# Patient Record
Sex: MALE | Race: WHITE | NOT HISPANIC OR LATINO | Employment: FULL TIME | URBAN - METROPOLITAN AREA
[De-identification: names, ages, dates, MRNs, and addresses within clinical notes are randomized per-mention and may not be internally consistent; named-entity substitution may affect disease eponyms.]

---

## 2019-02-19 ENCOUNTER — OFFICE VISIT (OUTPATIENT)
Dept: GASTROENTEROLOGY | Facility: CLINIC | Age: 59
End: 2019-02-19
Payer: COMMERCIAL

## 2019-02-19 VITALS
WEIGHT: 215 LBS | BODY MASS INDEX: 30.78 KG/M2 | DIASTOLIC BLOOD PRESSURE: 82 MMHG | HEART RATE: 75 BPM | SYSTOLIC BLOOD PRESSURE: 130 MMHG | HEIGHT: 70 IN

## 2019-02-19 DIAGNOSIS — K21.9 GASTROESOPHAGEAL REFLUX DISEASE, ESOPHAGITIS PRESENCE NOT SPECIFIED: Primary | ICD-10-CM

## 2019-02-19 DIAGNOSIS — Z83.71 FAMILY HISTORY OF COLONIC POLYPS: ICD-10-CM

## 2019-02-19 DIAGNOSIS — Z12.11 SCREENING FOR COLON CANCER: ICD-10-CM

## 2019-02-19 DIAGNOSIS — R10.13 EPIGASTRIC PAIN: ICD-10-CM

## 2019-02-19 PROCEDURE — 99243 OFF/OP CNSLTJ NEW/EST LOW 30: CPT | Performed by: INTERNAL MEDICINE

## 2019-02-19 NOTE — PATIENT INSTRUCTIONS
Reflux diet  OK to take antacids as needed  Avoid fatty foods, caffeine, alcohol, ibuprofen  Schedule upper endoscopy/EGD

## 2019-02-19 NOTE — PROGRESS NOTES
[address] [facility] - Outpatient Consultation  Curt Garcia 66 y o  male MRN: [id number]  Encounter: [id number]      ASSESSMENT AND PLAN:      1  Gastroesophageal reflux disease, esophagitis presence not specified  Epigastric pain and reflux symptoms are suggestive of gastroesophageal reflux disease with or without esophagitis, rule out peptic ulcer disease, rule out gastritis, rule out H pylori  Less likely Fields's or neoplasm  Motility disorder unlikely  Due to persistent symptoms and intermittent use of NSAIDs advise EGD to rule out peptic ulcer disease or H pylori   -reflux diet, avoid trigger foods  -avoid NSAIDs  -Plan EGD    2  Epigastric pain  Differential and evaluation as discussed above for GERD  3  Family history of colonic polyps  Believes his father had colonic polyps  Negative colonoscopy December 2014  Screening every 5 years recommended  4  Screening for colon cancer  As above, 5 year screening recommended due to family history of polyps  Followup Appointment[de-identified]  1-2 months pending EGD  ______________________________________________________________________    Chief Complaint   Patient presents with    GERD     belching        HPI:   Curt Garcia is a 66y o  year old male who presents with epigastric pain and burning chest/throat pain  About a month ago, had pasta/sauce and wine  Subsequently had belching and epigastric pain  Thought GI  bug   Got short course of antibiotics as others felt sick too, but no help  No appetite  Has sore throat and burning  Feels like sinus drainage and discomfort but does not have any presently  No odynophagia or dysphagia  Feels full quickly  Epigastric hunger pain  Sore throat  No nausea or vomiting  Awakes with feeling of acid and water brash  No change in stools, no diarrhea, melena or heme  No change in appetite or weight (maybe a few pounds)  No ASA, occasional ibuprofen, not much lately    Eating later than usual, a little help with antacids  Historical Information   History reviewed  No pertinent past medical history  Past Surgical History:   Procedure Laterality Date    APPENDECTOMY      HERNIA REPAIR       Social History   Social History     Substance and Sexual Activity   Alcohol Use Yes    Frequency: Monthly or less     Social History     Substance and Sexual Activity   Drug Use Never     Social History     Tobacco Use   Smoking Status Never Smoker   Smokeless Tobacco Never Used     Family History   Problem Relation Age of Onset    No Known Problems Mother     Colon polyps Father     No Known Problems Sister     No Known Problems Brother        Meds/Allergies     No current outpatient medications on file  No Known Allergies      Objective     Blood pressure 130/82, pulse 75, height 5' 10" (1 778 m), weight 97 5 kg (215 lb)  Body mass index is 30 85 kg/m²  PHYSICAL EXAM:    General Appearance:  Alert, cooperative, no distress  HEENT:  Normocephalic, atraumatic, anicteric    no oral lesions or thrush  Neck:  Supple, symmetrical, trachea midline  Lungs:  Clear to auscultation bilaterally; no rales, rhonchi or wheezing; respirations unlabored   Heart[de-identified]  Regular rate and rhythm; no murmur, rub, or gallop  Abdomen:  Soft, mild epigastric tenderness without rebound or guarding, no right upper quadrant tenderness or Kunz sign, non-distended; normal bowel sounds; no masses, no organomegaly   Rectal:  Deferred   Extremities: No cyanosis, clubbing or edema   Skin:  No jaundice, rashes, or lesions   Lymph nodes: No palpable cervical lymphadenopathy    Lab Results:   No results found for: WBC, HGB, HCT, MCV, PLT  No results found for: NA, K, CL, CO2, ANIONGAP, BUN, CREATININE, GLUCOSE, GLUF, CALCIUM, CORRECTEDCA, AST, ALT, ALKPHOS, PROT, BILITOT, EGFR  No results found for: IRON, TIBC, FERRITIN  No results found for: LIPASE    Radiology Results:   No results found        REVIEW OF SYSTEMS:    CONSTITUTIONAL: Denies any fever, chills, rigors, and weight loss  HEENT: No earache or tinnitus  Denies hearing loss or visual disturbances  Positive for sore throat and hoarseness  CARDIOVASCULAR: No chest pain  Occasional palpitations  RESPIRATORY: Denies any cough, hemoptysis, shortness of breath or dyspnea on exertion  GASTROINTESTINAL: As noted in the History of Present Illness  GENITOURINARY: No problems with urination  Denies any hematuria or dysuria  NEUROLOGIC: No dizziness or vertigo, denies headaches  MUSCULOSKELETAL: Denies any muscle or joint pain  SKIN: Denies skin rashes or itching  ENDOCRINE: Denies excessive thirst  Denies intolerance to heat or cold  PSYCHOSOCIAL: Denies depression or anxiety  Denies any recent memory loss

## 2019-02-19 NOTE — LETTER
February 19, 2019     Janes Chester  [facility]  [address] [facility]    Patient: Grace Vidal   YOB: 1960   Date of Visit: 2/19/2019       Dear Dr Mcdonough Mage: Thank you for referring rGace Vidal to me for evaluation  Below are my notes for this consultation  If you have questions, please do not hesitate to call me  I look forward to following your patient along with you  Sincerely,        Nupur Rao MD        CC: No Recipients  Nupur Rao MD  2/19/2019  3:15 PM  Sign at close encounter    [address] [facility] - Outpatient Consult[facility]  Grace Vidal 66 y o  male MRN: [id number]  Encounter: [id number]      ASSESSMENT AND PLAN:      1  Gastroesophageal reflux disease, esophagitis presence not specified  Epigastric pain and reflux symptoms are suggestive of gastroesophageal reflux disease with or without esophagitis, rule out peptic ulcer disease, rule out gastritis, rule out H pylori  Less likely Fields's or neoplasm  Motility disorder unlikely  Due to persistent symptoms and intermittent use of NSAIDs advise EGD to rule out peptic ulcer disease or H pylori   -reflux diet, avoid trigger foods  -avoid NSAIDs  -Plan EGD    2  Epigastric pain  Differential and evaluation as discussed above for GERD  3  Family history of colonic polyps  Believes his father had colonic polyps  Negative colonoscopy December 2014  Screening every 5 years recommended  4  Screening for colon cancer  As above, 5 year screening recommended due to family history of polyps  Followup Appointment[de-identified]  1-2 months pending EGD  ______________________________________________________________________    Chief Complaint   Patient presents with    GERD     belching        HPI:   Grace Vidal is a 66y o  year old male who presents with epigastric pain and burning chest/throat pain  About a month ago, had pasta/sauce and wine    Subsequently had belching and epigastric pain  Thought GI  bug   Got short course of antibiotics as others felt sick too, but no help  No appetite  Has sore throat and burning  Feels like sinus drainage and discomfort but does not have any presently  No odynophagia or dysphagia  Feels full quickly  Epigastric hunger pain  Sore throat  No nausea or vomiting  Awakes with feeling of acid and water brash  No change in stools, no diarrhea, melena or heme  No change in appetite or weight (maybe a few pounds)  No ASA, occasional ibuprofen, not much lately  Eating later than usual, a little help with antacids  Historical Information   History reviewed  No pertinent past medical history  Past Surgical History:   Procedure Laterality Date    APPENDECTOMY      HERNIA REPAIR       Social History   Social History     Substance and Sexual Activity   Alcohol Use Yes    Frequency: Monthly or less     Social History     Substance and Sexual Activity   Drug Use Never     Social History     Tobacco Use   Smoking Status Never Smoker   Smokeless Tobacco Never Used     Family History   Problem Relation Age of Onset    No Known Problems Mother     Colon polyps Father     No Known Problems Sister     No Known Problems Brother        Meds/Allergies     No current outpatient medications on file  No Known Allergies      Objective     Blood pressure 130/82, pulse 75, height 5' 10" (1 778 m), weight 97 5 kg (215 lb)  Body mass index is 30 85 kg/m²  PHYSICAL EXAM:    General Appearance:  Alert, cooperative, no distress  HEENT:  Normocephalic, atraumatic, anicteric     no oral lesions or thrush  Neck:  Supple, symmetrical, trachea midline  Lungs:  Clear to auscultation bilaterally; no rales, rhonchi or wheezing; respirations unlabored   Heart[de-identified]  Regular rate and rhythm; no murmur, rub, or gallop    Abdomen:  Soft,  mild epigastric tenderness without rebound or guarding, no right upper quadrant tenderness or Kunz sign, non-distended; normal bowel sounds; no masses, no organomegaly   Rectal:  Deferred   Extremities: No cyanosis, clubbing or edema   Skin:  No jaundice, rashes, or lesions   Lymph nodes: No palpable cervical lymphadenopathy    Lab Results:   No results found for: WBC, HGB, HCT, MCV, PLT  No results found for: NA, K, CL, CO2, ANIONGAP, BUN, CREATININE, GLUCOSE, GLUF, CALCIUM, CORRECTEDCA, AST, ALT, ALKPHOS, PROT, BILITOT, EGFR  No results found for: IRON, TIBC, FERRITIN  No results found for: LIPASE    Radiology Results:   No results found  REVIEW OF SYSTEMS:    CONSTITUTIONAL: Denies any fever, chills, rigors, and weight loss  HEENT: No earache or tinnitus  Denies hearing loss or visual disturbances  Positive for sore throat and hoarseness  CARDIOVASCULAR: No chest pain  Occasional palpitations  RESPIRATORY: Denies any cough, hemoptysis, shortness of breath or dyspnea on exertion  GASTROINTESTINAL: As noted in the History of Present Illness  GENITOURINARY: No problems with urination  Denies any hematuria or dysuria  NEUROLOGIC: No dizziness or vertigo, denies headaches  MUSCULOSKELETAL: Denies any muscle or joint pain  SKIN: Denies skin rashes or itching  ENDOCRINE: Denies excessive thirst  Denies intolerance to heat or cold  PSYCHOSOCIAL: Denies depression or anxiety  Denies any recent memory loss

## 2019-03-07 ENCOUNTER — TELEPHONE (OUTPATIENT)
Dept: GASTROENTEROLOGY | Facility: CLINIC | Age: 59
End: 2019-03-07

## 2019-03-07 RX ORDER — FAMOTIDINE 40 MG/1
40 TABLET, FILM COATED ORAL DAILY
Qty: 30 TABLET | Refills: 2 | Status: CANCELLED | OUTPATIENT
Start: 2019-03-07

## 2019-03-07 NOTE — TELEPHONE ENCOUNTER
Pt seen at endo  RX prescribed by Dr Nevaeh Lei  Patient took hard copy to [facility]  Wife called that rx was not at pharmacy  Med called to pharmacy

## 2019-04-01 DIAGNOSIS — K21.9 GASTROESOPHAGEAL REFLUX DISEASE, ESOPHAGITIS PRESENCE NOT SPECIFIED: Primary | ICD-10-CM

## 2019-04-01 RX ORDER — FAMOTIDINE 40 MG/1
40 TABLET, FILM COATED ORAL 2 TIMES DAILY
Qty: 60 TABLET | Refills: 2 | Status: SHIPPED | OUTPATIENT
Start: 2019-04-01 | End: 2019-05-15

## 2019-05-13 ENCOUNTER — TELEPHONE (OUTPATIENT)
Dept: GASTROENTEROLOGY | Facility: CLINIC | Age: 59
End: 2019-05-13

## 2019-05-15 ENCOUNTER — OFFICE VISIT (OUTPATIENT)
Dept: GASTROENTEROLOGY | Facility: CLINIC | Age: 59
End: 2019-05-15
Payer: COMMERCIAL

## 2019-05-15 VITALS
SYSTOLIC BLOOD PRESSURE: 140 MMHG | DIASTOLIC BLOOD PRESSURE: 84 MMHG | HEIGHT: 70 IN | WEIGHT: 214 LBS | HEART RATE: 64 BPM | BODY MASS INDEX: 30.64 KG/M2

## 2019-05-15 DIAGNOSIS — K21.9 GASTROESOPHAGEAL REFLUX DISEASE WITHOUT ESOPHAGITIS: Primary | ICD-10-CM

## 2019-05-15 DIAGNOSIS — K21.9 GASTROESOPHAGEAL REFLUX DISEASE, ESOPHAGITIS PRESENCE NOT SPECIFIED: ICD-10-CM

## 2019-05-15 DIAGNOSIS — Z83.71 FAMILY HISTORY OF COLONIC POLYPS: ICD-10-CM

## 2019-05-15 DIAGNOSIS — R10.13 EPIGASTRIC PAIN: ICD-10-CM

## 2019-05-15 DIAGNOSIS — Z12.11 COLON CANCER SCREENING: ICD-10-CM

## 2019-05-15 PROBLEM — Z83.719 FAMILY HISTORY OF COLONIC POLYPS: Status: ACTIVE | Noted: 2019-05-15

## 2019-05-15 PROCEDURE — 99214 OFFICE O/P EST MOD 30 MIN: CPT | Performed by: NURSE PRACTITIONER

## 2019-05-15 RX ORDER — PANTOPRAZOLE SODIUM 40 MG/1
40 TABLET, DELAYED RELEASE ORAL DAILY
Qty: 30 TABLET | Refills: 2 | Status: SHIPPED | OUTPATIENT
Start: 2019-05-15

## 2019-05-15 RX ORDER — FAMOTIDINE 40 MG/1
40 TABLET, FILM COATED ORAL
Qty: 60 TABLET | Refills: 2
Start: 2019-05-15 | End: 2020-11-10 | Stop reason: SDUPTHER

## 2019-12-02 ENCOUNTER — TELEPHONE (OUTPATIENT)
Dept: GASTROENTEROLOGY | Facility: CLINIC | Age: 59
End: 2019-12-02

## 2019-12-02 NOTE — TELEPHONE ENCOUNTER
Spoke with pt regarding recall- will call back to make appt for the new year after looking at good dates for colon

## 2020-01-20 NOTE — TELEPHONE ENCOUNTER
Dr Catarina Espinal has been contacted to schedule recall colon with no response-please advise   Thank you

## 2020-11-10 ENCOUNTER — TELEPHONE (OUTPATIENT)
Dept: GASTROENTEROLOGY | Facility: CLINIC | Age: 60
End: 2020-11-10

## 2020-11-10 DIAGNOSIS — K21.9 GASTROESOPHAGEAL REFLUX DISEASE: ICD-10-CM

## 2020-11-10 RX ORDER — FAMOTIDINE 40 MG/1
40 TABLET, FILM COATED ORAL
Qty: 60 TABLET | Refills: 1
Start: 2020-11-10

## 2023-07-17 ENCOUNTER — APPOINTMENT (OUTPATIENT)
Dept: RADIOLOGY | Facility: HOSPITAL | Age: 63
End: 2023-07-17
Payer: COMMERCIAL

## 2023-07-17 ENCOUNTER — HOSPITAL ENCOUNTER (EMERGENCY)
Facility: HOSPITAL | Age: 63
Discharge: HOME | End: 2023-07-17
Attending: EMERGENCY MEDICINE
Payer: COMMERCIAL

## 2023-07-17 VITALS
HEART RATE: 58 BPM | OXYGEN SATURATION: 99 % | SYSTOLIC BLOOD PRESSURE: 186 MMHG | TEMPERATURE: 97.8 F | WEIGHT: 210 LBS | HEIGHT: 70 IN | DIASTOLIC BLOOD PRESSURE: 91 MMHG | BODY MASS INDEX: 30.06 KG/M2 | RESPIRATION RATE: 18 BRPM

## 2023-07-17 DIAGNOSIS — R07.9 CHEST PAIN: ICD-10-CM

## 2023-07-17 DIAGNOSIS — R03.0 ELEVATED BLOOD PRESSURE READING: ICD-10-CM

## 2023-07-17 DIAGNOSIS — I49.3 SYMPTOMATIC PVCS: Primary | ICD-10-CM

## 2023-07-17 LAB
2HR DELTA HS TROPONIN: -2 NG/L
ALBUMIN SERPL BCP-MCNC: 4.4 G/DL (ref 3.5–5)
ALP SERPL-CCNC: 49 U/L (ref 34–104)
ALT SERPL W P-5'-P-CCNC: 19 U/L (ref 7–52)
ANION GAP SERPL CALCULATED.3IONS-SCNC: 5 MMOL/L
AST SERPL W P-5'-P-CCNC: 15 U/L (ref 13–39)
ATRIAL RATE: 64 BPM
BASOPHILS # BLD AUTO: 0.05 THOUSANDS/ÂΜL (ref 0–0.1)
BASOPHILS NFR BLD AUTO: 1 % (ref 0–1)
BILIRUB SERPL-MCNC: 0.87 MG/DL (ref 0.2–1)
BUN SERPL-MCNC: 17 MG/DL (ref 5–25)
CALCIUM SERPL-MCNC: 9.2 MG/DL (ref 8.4–10.2)
CARDIAC TROPONIN I PNL SERPL HS: 4 NG/L
CARDIAC TROPONIN I PNL SERPL HS: 6 NG/L
CHLORIDE SERPL-SCNC: 103 MMOL/L (ref 96–108)
CO2 SERPL-SCNC: 30 MMOL/L (ref 21–32)
CREAT SERPL-MCNC: 1.23 MG/DL (ref 0.6–1.3)
EOSINOPHIL # BLD AUTO: 0.22 THOUSAND/ÂΜL (ref 0–0.61)
EOSINOPHIL NFR BLD AUTO: 3 % (ref 0–6)
ERYTHROCYTE [DISTWIDTH] IN BLOOD BY AUTOMATED COUNT: 12.1 % (ref 11.6–15.1)
GFR SERPL CREATININE-BSD FRML MDRD: 62 ML/MIN/1.73SQ M
GLUCOSE SERPL-MCNC: 114 MG/DL (ref 65–140)
HCT VFR BLD AUTO: 47.3 % (ref 36.5–49.3)
HGB BLD-MCNC: 16.3 G/DL (ref 12–17)
IMM GRANULOCYTES # BLD AUTO: 0.05 THOUSAND/UL (ref 0–0.2)
IMM GRANULOCYTES NFR BLD AUTO: 1 % (ref 0–2)
LYMPHOCYTES # BLD AUTO: 3.21 THOUSANDS/ÂΜL (ref 0.6–4.47)
LYMPHOCYTES NFR BLD AUTO: 42 % (ref 14–44)
MAGNESIUM SERPL-MCNC: 2 MG/DL (ref 1.9–2.7)
MCH RBC QN AUTO: 30.5 PG (ref 26.8–34.3)
MCHC RBC AUTO-ENTMCNC: 34.5 G/DL (ref 31.4–37.4)
MCV RBC AUTO: 88 FL (ref 82–98)
MONOCYTES # BLD AUTO: 0.91 THOUSAND/ÂΜL (ref 0.17–1.22)
MONOCYTES NFR BLD AUTO: 12 % (ref 4–12)
NEUTROPHILS # BLD AUTO: 3.26 THOUSANDS/ÂΜL (ref 1.85–7.62)
NEUTS SEG NFR BLD AUTO: 41 % (ref 43–75)
NRBC BLD AUTO-RTO: 0 /100 WBCS
P AXIS: 37 DEGREES
PLATELET # BLD AUTO: 222 THOUSANDS/UL (ref 149–390)
PMV BLD AUTO: 10.5 FL (ref 8.9–12.7)
POTASSIUM SERPL-SCNC: 3.8 MMOL/L (ref 3.5–5.3)
PR INTERVAL: 148 MS
PROT SERPL-MCNC: 7.2 G/DL (ref 6.4–8.4)
QRS AXIS: 74 DEGREES
QRSD INTERVAL: 88 MS
QT INTERVAL: 406 MS
QTC INTERVAL: 418 MS
RBC # BLD AUTO: 5.35 MILLION/UL (ref 3.88–5.62)
SODIUM SERPL-SCNC: 138 MMOL/L (ref 135–147)
T WAVE AXIS: 28 DEGREES
TSH SERPL DL<=0.05 MIU/L-ACNC: 2.36 UIU/ML (ref 0.45–4.5)
VENTRICULAR RATE: 64 BPM
WBC # BLD AUTO: 7.7 THOUSAND/UL (ref 4.31–10.16)

## 2023-07-17 PROCEDURE — 71046 X-RAY EXAM CHEST 2 VIEWS: CPT

## 2023-07-17 PROCEDURE — 80053 COMPREHEN METABOLIC PANEL: CPT | Performed by: EMERGENCY MEDICINE

## 2023-07-17 PROCEDURE — 85025 COMPLETE CBC W/AUTO DIFF WBC: CPT | Performed by: EMERGENCY MEDICINE

## 2023-07-17 PROCEDURE — 36415 COLL VENOUS BLD VENIPUNCTURE: CPT

## 2023-07-17 PROCEDURE — 83735 ASSAY OF MAGNESIUM: CPT | Performed by: EMERGENCY MEDICINE

## 2023-07-17 PROCEDURE — 84443 ASSAY THYROID STIM HORMONE: CPT | Performed by: EMERGENCY MEDICINE

## 2023-07-17 PROCEDURE — 84484 ASSAY OF TROPONIN QUANT: CPT | Performed by: EMERGENCY MEDICINE

## 2023-07-17 PROCEDURE — 99285 EMERGENCY DEPT VISIT HI MDM: CPT

## 2023-07-17 PROCEDURE — 93005 ELECTROCARDIOGRAM TRACING: CPT

## 2023-07-17 PROCEDURE — 93010 ELECTROCARDIOGRAM REPORT: CPT | Performed by: INTERNAL MEDICINE

## 2023-07-17 RX ORDER — MAGNESIUM HYDROXIDE/ALUMINUM HYDROXICE/SIMETHICONE 120; 1200; 1200 MG/30ML; MG/30ML; MG/30ML
30 SUSPENSION ORAL ONCE
Status: COMPLETED | OUTPATIENT
Start: 2023-07-17 | End: 2023-07-17

## 2023-07-17 RX ADMIN — ALUMINUM HYDROXIDE, MAGNESIUM HYDROXIDE, AND DIMETHICONE 30 ML: 200; 20; 200 SUSPENSION ORAL at 11:00

## 2023-07-17 NOTE — Clinical Note
Ruby Calles was seen and treated in our emergency department on 7/17/2023. No restrictions            Diagnosis:     Candi Dutta  is off the rest of the shift today. He may return on this date: If you have any questions or concerns, please don't hesitate to call.       Jared Cantrell MD    ______________________________           _______________          _______________  Hospital Representative                              Date                                Time

## 2023-07-17 NOTE — Clinical Note
Saul Santiago was seen and treated in our emergency department on 7/17/2023. No restrictions            Diagnosis:     Bryan Lang  is off the rest of the shift today. He may return on this date: If you have any questions or concerns, please don't hesitate to call.       Wilfredo Cota MD    ______________________________           _______________          _______________  Hospital Representative                              Date                                Time

## 2023-07-17 NOTE — Clinical Note
Cydney Myles was seen and treated in our emergency department on 7/17/2023. No restrictions            Diagnosis:     Liv San  is off the rest of the shift today. He may return on this date: If you have any questions or concerns, please don't hesitate to call.       Mk Root MD    ______________________________           _______________          _______________  Hospital Representative                              Date                                Time

## 2023-07-17 NOTE — ED PROVIDER NOTES
History  Chief Complaint   Patient presents with   • Chest Pain     Pt has increased chest pain for a week. Pt feels a pounding in his chest. He states he had some left arm pain and neck pain. Pt states he now is having some dizziness. Pt gets some headache too,     66year old male presents for evaluation of chest pain and palpitations. Patient states he has had a fluttering sensation in his chest radiating to his throat which has been happening intermittently for the past 3 weeks. He notices the symptoms more while lying down. He states he has also had a burning central chest pain which had worsened today and is only now starting to subside. History of reflux in the past.  Taking omeprazole. Prior to Admission Medications   Prescriptions Last Dose Informant Patient Reported? Taking?   famotidine (PEPCID) 40 MG tablet   No No   Sig: Take 1 tablet (40 mg total) by mouth daily at bedtime as needed for heartburn   pantoprazole (PROTONIX) 40 mg tablet   No No   Sig: Take 1 tablet (40 mg total) by mouth daily      Facility-Administered Medications: None       Past Medical History:   Diagnosis Date   • Peritonitis ([address])     with appendicitis       Past Surgical History:   Procedure Laterality Date   • APPENDECTOMY     • COLONOSCOPY      Last done 12/16/2014   • ESOPHAGOGASTRODUODENOSCOPY      Last done 3/7/2019    • HERNIA REPAIR     • INGUINAL HERNIA REPAIR Bilateral        Family History   Problem Relation Age of Onset   • No Known Problems Mother    • Colon polyps Father    • Prostate cancer Father    • No Known Problems Sister    • No Known Problems Brother      I have reviewed and agree with the history as documented.     E-Cigarette/Vaping     E-Cigarette/Vaping Substances     Social History     Tobacco Use   • Smoking status: Never   • Smokeless tobacco: Never   Substance Use Topics   • Alcohol use: Yes     Comment: rare social   • Drug use: Never       Review of Systems    Physical Exam  Physical Exam  Vitals and nursing note reviewed. HENT:      Head: Normocephalic and atraumatic. Eyes:      Conjunctiva/sclera: Conjunctivae normal.   Cardiovascular:      Rate and Rhythm: Normal rate and regular rhythm. Pulses: Normal pulses. Heart sounds: Normal heart sounds. Pulmonary:      Effort: Pulmonary effort is normal. No respiratory distress. Breath sounds: Normal breath sounds. Abdominal:      General: There is no distension. Skin:     General: Skin is warm and dry. Neurological:      Mental Status: He is alert.          Vital Signs  ED Triage Vitals   Temperature Pulse Respirations Blood Pressure SpO2   07/17/23 0824 07/17/23 0822 07/17/23 0822 07/17/23 0822 07/17/23 0822   97.8 °F (36.6 °C) 65 18 (!) 174/96 99 %      Temp Source Heart Rate Source Patient Position - Orthostatic VS BP Location FiO2 (%)   07/17/23 0824 -- -- -- --   Temporal          Pain Score       --                  Vitals:    07/17/23 0822 07/17/23 1101   BP: (!) 174/96 (!) 186/91   Pulse: 65 58         Visual Acuity      ED Medications  Medications   aluminum-magnesium hydroxide-simethicone (MAALOX) oral suspension 30 mL (30 mL Oral Given 7/17/23 1100)       Diagnostic Studies  Results Reviewed     Procedure Component Value Units Date/Time    TSH, 3rd generation with Free T4 reflex [[id number]]  (Normal) Collected: 07/17/23 1059    Lab Status: Final result Specimen: Blood from Arm, Left Updated: 07/17/23 1142     TSH 3RD GENERATON 2.359 uIU/mL     HS Troponin I 2hr [[id number]]  (Normal) Collected: 07/17/23 1059    Lab Status: Final result Specimen: Blood from Arm, Left Updated: 07/17/23 1134     hs TnI 2hr 4 ng/L      Delta 2hr hsTnI -2 ng/L     Magnesium [[id number]]  (Normal) Collected: 07/17/23 1059    Lab Status: Final result Specimen: Blood from Arm, Left Updated: 07/17/23 1130     Magnesium 2.0 mg/dL     HS Troponin 0hr (reflex protocol) [[id number]]  (Normal) Collected: 07/17/23 0828    Lab Status: Final result Specimen: Blood from Arm, Right Updated: 07/17/23 0856     hs TnI 0hr 6 ng/L     Comprehensive metabolic panel [[id number]] Collected: 07/17/23 0828    Lab Status: Final result Specimen: Blood from Arm, Right Updated: 07/17/23 0848     Sodium 138 mmol/L      Potassium 3.8 mmol/L      Chloride 103 mmol/L      CO2 30 mmol/L      ANION GAP 5 mmol/L      BUN 17 mg/dL      Creatinine 1.23 mg/dL      Glucose 114 mg/dL      Calcium 9.2 mg/dL      AST 15 U/L      ALT 19 U/L      Alkaline Phosphatase 49 U/L      Total Protein 7.2 g/dL      Albumin 4.4 g/dL      Total Bilirubin 0.87 mg/dL      eGFR 62 ml/min/1.73sq m     Narrative:      [place] guidelines for Chronic Kidney Disease (CKD):   •  Stage 1 with normal or high GFR (GFR > 90 mL/min/1.73 square meters)  •  Stage 2 Mild CKD (GFR = 60-89 mL/min/1.73 square meters)  •  Stage 3A Moderate CKD (GFR = 45-59 mL/min/1.73 square meters)  •  Stage 3B Moderate CKD (GFR = 30-44 mL/min/1.73 square meters)  •  Stage 4 Severe CKD (GFR = 15-29 mL/min/1.73 square meters)  •  Stage 5 End Stage CKD (GFR <15 mL/min/1.73 square meters)  Note: GFR calculation is accurate only with a steady state creatinine    CBC and differential [[id number]]  (Abnormal) Collected: 07/17/23 0828    Lab Status: Final result Specimen: Blood from Arm, Right Updated: 07/17/23 0833     WBC 7.70 Thousand/uL      RBC 5.35 Million/uL      Hemoglobin 16.3 g/dL      Hematocrit 47.3 %      MCV 88 fL      MCH 30.5 pg      MCHC 34.5 g/dL      RDW 12.1 %      MPV 10.5 fL      Platelets 389 Thousands/uL      nRBC 0 /100 WBCs      Neutrophils Relative 41 %      Immat GRANS % 1 %      Lymphocytes Relative 42 %      Monocytes Relative 12 %      Eosinophils Relative 3 %      Basophils Relative 1 %      Neutrophils Absolute 3.26 Thousands/µL      Immature Grans Absolute 0.05 Thousand/uL      Lymphocytes Absolute 3.21 Thousands/µL      Monocytes Absolute 0.91 Thousand/µL      Eosinophils Absolute 0.22 Thousand/µL      Basophils Absolute 0.05 Thousands/µL                  XR chest 2 views   Final Result by Ro Finley MD (07/17 1001)      No acute cardiopulmonary disease. Workstation performed: [id number]                    Procedures  ECG 12 Lead Documentation Only    Date/Time: 7/17/2023 10:51 AM    Performed by: Corrina Ulloa MD  Authorized by: Corrina Ulloa MD    Indications / Diagnosis:  Chest pain, palpitations  ECG reviewed by me, the ED Provider: yes    Patient location:  ED  Previous ECG:     Previous ECG:  Unavailable  Interpretation:     Interpretation: abnormal    Rate:     ECG rate:  64    ECG rate assessment: normal    Rhythm:     Rhythm: sinus rhythm    Ectopy:     Ectopy: PVCs    QRS:     QRS axis:  Normal    QRS intervals:  Normal  Conduction:     Conduction: normal    ST segments:     ST segments:  Normal  T waves:     T waves: normal               ED Course             HEART Risk Score    Flowsheet Row Most Recent Value   Heart Score Risk Calculator    History 0 Filed at: 07/17/2023 1052   ECG 0 Filed at: 07/17/2023 1052   Age 1 Filed at: 07/17/2023 1052   Risk Factors 1 Filed at: 07/17/2023 1052   Troponin 0 Filed at: 07/17/2023 1052   HEART Score 2 Filed at: 07/17/2023 1052                                      Medical Decision Making  66year old male presents for evaluation of chest pain and palpitations. Palpitations appear to be secondary to PVCs which are seen on the monitor. EKG without acute ischemic changes or arrhythmia on my independent interpretation. CXR unremarkable on my independent interpretation. TSH within normal limits. Cardiology follow up for PVCs. Patient counseled to avoid caffeine/stimulants. Chest pain may be secondary to reflux. Continue omeprazole. No food or drink w/in 2 hours of bedtime. PCP follow up for elevated blood pressure. Return precautions provided.      Amount and/or Complexity of Data Reviewed  Labs: ordered. Radiology: ordered. Risk  OTC drugs. Disposition  Final diagnoses:   Elevated blood pressure reading   Chest pain   Symptomatic PVCs     Time reflects when diagnosis was documented in both MDM as applicable and the Disposition within this note     Time User Action Codes Description Comment    7/17/2023 10:52 AM Cat, Darius [place] Add [R03.0] Elevated blood pressure reading     7/17/2023 10:52 AM Emelyn Muck Add [R07.9] Chest pain     7/17/2023 10:52 AM Cat, Dub [place] Add [I49.3] Symptomatic PVCs     7/17/2023 10:52 AM Cat, Dub [place] Modify [R03.0] Elevated blood pressure reading     7/17/2023 10:52 AM Emelyn Muck Modify [I49.3] Symptomatic PVCs       ED Disposition     ED Disposition   Discharge    Condition   Stable    Date/Time   Mon Jul 17, 2023 12:01 PM    [address] discharge to home/self care. Follow-up Information     Follow up With Specialties Details Why Contact Info Additional Information    Misael Bledsoe MD Internal Medicine Schedule an appointment as soon as possible for a visit in 3 days for re-evaluation of blood pressure [address] [facility].   21[address] [facility] [address] [facility] 1501 [facility] Cardiology Schedule an appointment as soon as possible for a visit in 1 week for re-evaluation of palpitations 35 [facility] [address] [id number]  [address]  Cardiology 501 [facility], 35 [facility] [address], [address] [place], [place], [address] [facility] Emergency Department Emergency Medicine Go to  If symptoms worsen [address] [facility] [phone]  [address] [facility] Emergency Department, [address] hospitals, 1501 [facility], [address]          Patient's Medications   Discharge Prescriptions    No medications on file           PDMP Review None          ED Provider  Electronically Signed by           Edith Chirinos MD  07/17/23 8613

## 2023-07-17 NOTE — Clinical Note
accompanied Yoshi Tidwell to the emergency department on 7/17/2023. Return date if applicable: If you have any questions or concerns, please don't hesitate to call.       Ernie Jama MD

## 2023-08-09 ENCOUNTER — TELEPHONE (OUTPATIENT)
Dept: GASTROENTEROLOGY | Facility: CLINIC | Age: 63
End: 2023-08-09

## 2023-08-09 ENCOUNTER — OFFICE VISIT (OUTPATIENT)
Dept: GASTROENTEROLOGY | Facility: CLINIC | Age: 63
End: 2023-08-09
Payer: COMMERCIAL

## 2023-08-09 VITALS
BODY MASS INDEX: 30.78 KG/M2 | DIASTOLIC BLOOD PRESSURE: 80 MMHG | WEIGHT: 215 LBS | HEIGHT: 70 IN | SYSTOLIC BLOOD PRESSURE: 132 MMHG

## 2023-08-09 DIAGNOSIS — K21.00 GASTROESOPHAGEAL REFLUX DISEASE WITH ESOPHAGITIS WITHOUT HEMORRHAGE: Primary | ICD-10-CM

## 2023-08-09 DIAGNOSIS — Z83.71 FAMILY HISTORY OF COLONIC POLYPS: ICD-10-CM

## 2023-08-09 PROCEDURE — 99204 OFFICE O/P NEW MOD 45 MIN: CPT | Performed by: INTERNAL MEDICINE

## 2023-08-09 RX ORDER — OMEPRAZOLE 40 MG/1
20 CAPSULE, DELAYED RELEASE ORAL DAILY
COMMUNITY
End: 2023-08-09

## 2023-08-09 NOTE — TELEPHONE ENCOUNTER
Scheduled date of EGD/colonoscopy (as of today): 9/7/23  Physician performing EGD/colonoscopy: Dr Yamilet Serrano  Location of EGD/colonoscopy: [facility]  Desired bowel prep reviewed with patient: golytely  Instructions reviewed with patient by: gave patient instruction packet  Clearances:  No

## 2023-08-09 NOTE — PROGRESS NOTES
[address] [place] [address] [facility] Outpatient Consultation  Edith Guo 66 y.o. male MRN: [id number]  Encounter: [id number]    ASSESSMENT AND PLAN:      1. Gastroesophageal reflux disease with esophagitis  63M here today at the request of Dr Christianne Garcia for reflux. Sounds like over time, his reflux has been fairly well controlled. Takes the OTC omeprazole prn. - We will stop the omeprazole at this point. Prev EGD in 2019 had some esophagitis when the PPI was started. However, pt would like to come off meds if possible. - GERD lifestyle modifications discussed: Limit ETOH. Limit late night eating. Small freq meals. - Labs done last month, normal CBC and CMP. 2. Family history of colonic polyps  Overdue. Last colonoscopy in 2014. Will schedule at this time. - Colonoscopy; Future  - polyethylene glycol (COLYTE) 4000 mL solution; Take 4,000 mL by mouth once for 1 dose Take 4000 mL by mouth once for 1 dose. Use as directed  Dispense: 4000 mL; Refill: 0      Followup Appointment: pending above  ______________________________________________________________________    Chief Complaint   Patient presents with   • GERD       HPI:   Edith Guo is a 66y.o. year old male who presents today at the request of Dr. Christianne Garcia for GERD. Pt states he gets intermittent heartburn and hoarse voice. No sig regurgitation unless he eats late at night or gets spicy foods. 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 No dysphagia. No GIB. Prev had an EGD showing some esophagitis and was started on pepcid then to PPI. However, pt does not want to be on meds so he has been only taking it prn.  Stools are normal.    Historical Information   Past Medical History:   Diagnosis Date   • GERD (gastroesophageal reflux disease)    • Peritonitis ([address])     with appendicitis     Past Surgical History:   Procedure Laterality Date   • APPENDECTOMY     • COLONOSCOPY      Last done 12/16/2014   • COLONOSCOPY     • ESOPHAGOGASTRODUODENOSCOPY      Last done 3/7/2019    • HERNIA REPAIR     • INGUINAL HERNIA REPAIR Bilateral    • UPPER GASTROINTESTINAL ENDOSCOPY       Social History     Substance and Sexual Activity   Alcohol Use Yes    Comment: rare social     Social History     Substance and Sexual Activity   Drug Use Never     Social History     Tobacco Use   Smoking Status Never   Smokeless Tobacco Never     Family History   Problem Relation Age of Onset   • No Known Problems Mother    • Colon polyps Father    • Prostate cancer Father    • No Known Problems Sister    • No Known Problems Brother    • Colon cancer Neg Hx        Meds/Allergies     Current Outpatient Medications:   •  polyethylene glycol (COLYTE) 4000 mL solution    No Known Allergies    PHYSICAL EXAM:    Blood pressure 132/80, height 5' 10" (1.778 m), weight 97.5 kg (215 lb). Body mass index is 30.85 kg/m². General Appearance: NAD, cooperative, alert  Eyes: Anicteric, PERRLA, EOMI  ENT:  Normocephalic, atraumatic, normal mucosa. Neck:  Supple, symmetrical, trachea midline,   Resp:  Clear to auscultation bilaterally; no rales, rhonchi or wheezing; respirations unlabored   CV:  S1 S2, Regular rate and rhythm; no murmur, rub, or gallop. GI:  Soft, non-tender, non-distended; normal bowel sounds; no masses, no organomegaly   Rectal: Deferred  Musculoskeletal: No cyanosis, clubbing or edema. Normal ROM.   Skin:  No jaundice, rashes, or lesions   Heme/Lymph: No palpable cervical lymphadenopathy  Psych: Normal affect, good eye contact  Neuro: No gross deficits, AAOx3    Lab Results:   Lab Results   Component Value Date    WBC 7.70 07/17/2023    HGB 16.3 07/17/2023    HCT 47.3 07/17/2023    MCV 88 07/17/2023     07/17/2023     Lab Results   Component Value Date    K 3.8 07/17/2023     07/17/2023    CO2 30 07/17/2023    BUN 17 07/17/2023    CREATININE 1.23 07/17/2023    CALCIUM 9.2 07/17/2023    AST 15 07/17/2023    ALT 19 07/17/2023 ALKPHOS 49 07/17/2023    EGFR 62 07/17/2023     No results found for: "IRON", "TIBC", "FERRITIN"  No results found for: "LIPASE"    Radiology Results:   XR chest 2 views    Result Date: 7/17/2023  Narrative: CHEST INDICATION:   Chest Pain. COMPARISON:  None EXAM PERFORMED/VIEWS:  XR CHEST PA & LATERAL Images: 2 FINDINGS: Cardiomediastinal silhouette appears unremarkable. The lungs are clear. No pneumothorax or pleural effusion. Osseous structures appear within normal limits for patient age. Impression: No acute cardiopulmonary disease. Workstation performed: [id number]         REVIEW OF SYSTEMS:    CONSTITUTIONAL: Denies any fever, chills, rigors, and weight loss. HEENT: No earache or tinnitus. Denies hearing loss or visual disturbances. CARDIOVASCULAR: No chest pain or palpitations. RESPIRATORY: Denies any cough, hemoptysis, shortness of breath or dyspnea on exertion. GASTROINTESTINAL: As noted in the History of Present Illness. GENITOURINARY: No problems with urination. Denies any hematuria or dysuria. NEUROLOGIC: No dizziness or vertigo, denies headaches. MUSCULOSKELETAL: Denies any muscle or joint pain. SKIN: Denies skin rashes or itching. ENDOCRINE: Denies excessive thirst. Denies intolerance to heat or cold. PSYCHOSOCIAL: Denies depression or anxiety. Denies any recent memory loss.

## 2023-08-25 ENCOUNTER — TELEPHONE (OUTPATIENT)
Age: 63
End: 2023-08-25

## 2023-08-25 NOTE — TELEPHONE ENCOUNTER
Pt call transferred to nurses line. Pt had questions about medications prior to procedure. All questions answered.

## 2023-08-29 ENCOUNTER — TELEPHONE (OUTPATIENT)
Dept: GASTROENTEROLOGY | Facility: CLINIC | Age: 63
End: 2023-08-29

## 2023-09-05 ENCOUNTER — TELEPHONE (OUTPATIENT)
Dept: GASTROENTEROLOGY | Facility: AMBULATORY SURGERY CENTER | Age: 63
End: 2023-09-05

## 2023-09-07 ENCOUNTER — ANESTHESIA EVENT (OUTPATIENT)
Dept: GASTROENTEROLOGY | Facility: AMBULATORY SURGERY CENTER | Age: 63
End: 2023-09-07

## 2023-09-07 ENCOUNTER — HOSPITAL ENCOUNTER (OUTPATIENT)
Dept: GASTROENTEROLOGY | Facility: AMBULATORY SURGERY CENTER | Age: 63
Discharge: HOME | End: 2023-09-07
Attending: INTERNAL MEDICINE
Payer: COMMERCIAL

## 2023-09-07 ENCOUNTER — ANESTHESIA (OUTPATIENT)
Dept: GASTROENTEROLOGY | Facility: AMBULATORY SURGERY CENTER | Age: 63
End: 2023-09-07

## 2023-09-07 VITALS
WEIGHT: 215 LBS | TEMPERATURE: 97.6 F | DIASTOLIC BLOOD PRESSURE: 79 MMHG | SYSTOLIC BLOOD PRESSURE: 136 MMHG | RESPIRATION RATE: 17 BRPM | HEART RATE: 54 BPM | OXYGEN SATURATION: 97 % | HEIGHT: 70 IN | BODY MASS INDEX: 30.78 KG/M2

## 2023-09-07 DIAGNOSIS — K21.00 GASTROESOPHAGEAL REFLUX DISEASE WITH ESOPHAGITIS WITHOUT HEMORRHAGE: ICD-10-CM

## 2023-09-07 DIAGNOSIS — Z83.71 FAMILY HISTORY OF COLONIC POLYPS: ICD-10-CM

## 2023-09-07 PROCEDURE — 45385 COLONOSCOPY W/LESION REMOVAL: CPT | Performed by: INTERNAL MEDICINE

## 2023-09-07 PROCEDURE — 43239 EGD BIOPSY SINGLE/MULTIPLE: CPT | Performed by: INTERNAL MEDICINE

## 2023-09-07 RX ORDER — OMEPRAZOLE 40 MG/1
40 CAPSULE, DELAYED RELEASE ORAL DAILY
Qty: 30 CAPSULE | Refills: 2 | Status: SHIPPED | OUTPATIENT
Start: 2023-09-07

## 2023-09-07 RX ORDER — PROPOFOL 10 MG/ML
INJECTION, EMULSION INTRAVENOUS AS NEEDED
Status: DISCONTINUED | OUTPATIENT
Start: 2023-09-07 | End: 2023-09-07

## 2023-09-07 RX ORDER — SODIUM CHLORIDE, SODIUM LACTATE, POTASSIUM CHLORIDE, CALCIUM CHLORIDE 600; 310; 30; 20 MG/100ML; MG/100ML; MG/100ML; MG/100ML
50 INJECTION, SOLUTION INTRAVENOUS CONTINUOUS
Status: DISCONTINUED | OUTPATIENT
Start: 2023-09-07 | End: 2023-09-11 | Stop reason: HOSPADM

## 2023-09-07 RX ORDER — LIDOCAINE HYDROCHLORIDE 20 MG/ML
INJECTION, SOLUTION EPIDURAL; INFILTRATION; INTRACAUDAL; PERINEURAL AS NEEDED
Status: DISCONTINUED | OUTPATIENT
Start: 2023-09-07 | End: 2023-09-07

## 2023-09-07 RX ADMIN — PROPOFOL 50 MG: 10 INJECTION, EMULSION INTRAVENOUS at 10:22

## 2023-09-07 RX ADMIN — PROPOFOL 100 MG: 10 INJECTION, EMULSION INTRAVENOUS at 10:12

## 2023-09-07 RX ADMIN — PROPOFOL 50 MG: 10 INJECTION, EMULSION INTRAVENOUS at 10:29

## 2023-09-07 RX ADMIN — PROPOFOL 100 MG: 10 INJECTION, EMULSION INTRAVENOUS at 10:18

## 2023-09-07 RX ADMIN — SODIUM CHLORIDE, SODIUM LACTATE, POTASSIUM CHLORIDE, CALCIUM CHLORIDE 50 ML/HR: 600; 310; 30; 20 INJECTION, SOLUTION INTRAVENOUS at 09:41

## 2023-09-07 RX ADMIN — PROPOFOL 50 MG: 10 INJECTION, EMULSION INTRAVENOUS at 10:24

## 2023-09-07 RX ADMIN — LIDOCAINE HYDROCHLORIDE 50 MG: 20 INJECTION, SOLUTION EPIDURAL; INFILTRATION; INTRACAUDAL; PERINEURAL at 10:12

## 2023-09-07 RX ADMIN — PROPOFOL 50 MG: 10 INJECTION, EMULSION INTRAVENOUS at 10:32

## 2023-09-07 NOTE — H&P
History and Physical - [address] [facility]    Karely Santiago 66 y.o. male MRN: [id number]      HPI: Karely Santiago is a 66y.o. year old male who presents for reflux, colon cancer screening. FHx of colon polyps. No Known Allergies      REVIEW OF SYSTEMS: Per the HPI, and otherwise unremarkable.     Historical Information     Past Medical History:   Diagnosis Date   • GERD (gastroesophageal reflux disease)    • Peritonitis ([facility])     with appendicitis     Past Surgical History:   Procedure Laterality Date   • APPENDECTOMY     • COLONOSCOPY      Last done 12/16/2014   • COLONOSCOPY     • ESOPHAGOGASTRODUODENOSCOPY      Last done 3/7/2019    • HERNIA REPAIR     • INGUINAL HERNIA REPAIR Bilateral    • UPPER GASTROINTESTINAL ENDOSCOPY       Social History   Social History     Substance and Sexual Activity   Alcohol Use Yes    Comment: rare social     Social History     Substance and Sexual Activity   Drug Use Never     Social History     Tobacco Use   Smoking Status Never   Smokeless Tobacco Never     Family History   Problem Relation Age of Onset   • No Known Problems Mother    • Colon polyps Father    • Prostate cancer Father    • No Known Problems Sister    • No Known Problems Brother    • Colon cancer Neg Hx        Meds/Allergies       Current Outpatient Medications:   •  polyethylene glycol (COLYTE) 4000 mL solution    Current Facility-Administered Medications:   •  lactated ringers infusion, 50 mL/hr, Intravenous, Continuous, 50 mL/hr at 09/07/23 0941        Objective     /80   Pulse (!) 106   Temp 97.6 °F (36.4 °C) (Temporal)   Resp 20   Ht 5' 10" (1.778 m)   Wt 97.5 kg (215 lb)   SpO2 98%   BMI 30.85 kg/m²       PHYSICAL EXAM    Gen: NAD AAOx3  Head: Normocephalic, Atraumatic  CV: S1S2 RRR no m/r/g  CHEST: Clear b/l no c/r/w  ABD: soft, +BS NT/ND no masses  EXT: no edema      ASSESSMENT/PLAN:  This is a 66y.o. year old male here for EGD/COLON, and he is stable and optimized for his procedure.

## 2023-09-07 NOTE — ANESTHESIA POSTPROCEDURE EVALUATION
Post-Op Assessment Note    CV Status:  Stable  Pain Score: 0    Pain management: adequate     Mental Status:  Sleepy   Hydration Status:  Stable   PONV Controlled:  None   Airway Patency:  Patent      Post Op Vitals Reviewed: Yes      Staff: Anesthesiologist, CRNA         No notable events documented.     BP   136/73   Temp      Pulse 60   Resp   20   SpO2   97

## 2023-09-07 NOTE — ANESTHESIA PREPROCEDURE EVALUATION
Procedure:  COLONOSCOPY  EGD    Relevant Problems   ANESTHESIA (within normal limits)      CARDIO (within normal limits)      GI/HEPATIC   (+) Gastroesophageal reflux disease      PULMONARY (within normal limits)   (-) Sleep apnea   (-) Smoking   (-) URI (upper respiratory infection)    BMI 30    Physical Exam    Airway    Mallampati score: I  TM Distance: >3 FB  Neck ROM: full     Dental   No notable dental hx     Cardiovascular      Pulmonary      Other Findings       Lab Results   Component Value Date    WBC 7.70 07/17/2023    HGB 16.3 07/17/2023     07/17/2023     Lab Results   Component Value Date    SODIUM 138 07/17/2023    K 3.8 07/17/2023    BUN 17 07/17/2023    CREATININE 1.23 07/17/2023    EGFR 62 07/17/2023     Anesthesia Plan  ASA Score- 1     Anesthesia Type- IV sedation with anesthesia with ASA Monitors. Additional Monitors:   Airway Plan:           Plan Factors-Exercise tolerance (METS): >4 METS. Chart reviewed. Existing labs reviewed. Patient summary reviewed. Patient is not a current smoker. Induction- intravenous. Postoperative Plan-     Informed Consent- Anesthetic plan and risks discussed with patient. I personally reviewed this patient with the CRNA. Discussed and agreed on the Anesthesia Plan with the CRNA. Iain Gaona

## 2023-09-13 NOTE — RESULT ENCOUNTER NOTE
TK: recall colon 5 years. NURSING: please let pt know message below. Thank you. Dear Sebastian Cook,       The biopsies obtained during your endoscopy were negative/normal. Rocio Keepers is no evidence of significant inflammation, infection or any precancerous changes.  Please continue the PPI medication to heal the esophagitis as discussed. The polyps removed are adenomas which are the typical precancerous types of polyps. Nothing to worry about at this point given they were removed. OK for follow up colonoscopy in 5 years as discussed.      Dr Gordon Mcintosh

## 2024-02-21 PROBLEM — Z12.11 COLON CANCER SCREENING: Status: RESOLVED | Noted: 2019-05-15 | Resolved: 2024-02-21

## 2025-04-21 ENCOUNTER — HOSPITAL ENCOUNTER (OUTPATIENT)
Dept: HOSPITAL 99 - HWLAB | Age: 65
End: 2025-04-21
Payer: COMMERCIAL

## 2025-04-21 DIAGNOSIS — R22.1: Primary | ICD-10-CM

## 2025-04-21 LAB
BUN SERPL-MCNC: 18 MG/DL (ref 9–20)
EGFR: > 60

## 2025-04-25 ENCOUNTER — HOSPITAL ENCOUNTER (OUTPATIENT)
Dept: HOSPITAL 99 - RAD | Age: 65
End: 2025-04-25
Payer: COMMERCIAL

## 2025-04-25 DIAGNOSIS — K11.8: Primary | ICD-10-CM

## 2025-08-05 ENCOUNTER — OFFICE VISIT (OUTPATIENT)
Age: 65
End: 2025-08-05
Payer: COMMERCIAL

## 2025-08-07 PROBLEM — M25.552 PAIN IN LEFT HIP: Status: ACTIVE | Noted: 2025-08-07

## 2025-08-07 PROBLEM — R00.2 PALPITATIONS: Status: ACTIVE | Noted: 2025-08-07

## 2025-08-18 ENCOUNTER — APPOINTMENT (OUTPATIENT)
Age: 65
End: 2025-08-18
Payer: COMMERCIAL

## 2025-08-18 DIAGNOSIS — M25.552 LEFT HIP PAIN: ICD-10-CM

## 2025-08-18 DIAGNOSIS — M25.511 RIGHT SHOULDER PAIN, UNSPECIFIED CHRONICITY: ICD-10-CM

## 2025-08-18 PROCEDURE — 73030 X-RAY EXAM OF SHOULDER: CPT

## 2025-08-18 PROCEDURE — 73502 X-RAY EXAM HIP UNI 2-3 VIEWS: CPT
